# Patient Record
Sex: FEMALE | Race: WHITE | Employment: PART TIME | URBAN - NONMETROPOLITAN AREA
[De-identification: names, ages, dates, MRNs, and addresses within clinical notes are randomized per-mention and may not be internally consistent; named-entity substitution may affect disease eponyms.]

---

## 2018-07-10 ENCOUNTER — OFFICE VISIT (OUTPATIENT)
Dept: FAMILY MEDICINE CLINIC | Age: 43
End: 2018-07-10
Payer: COMMERCIAL

## 2018-07-10 VITALS
WEIGHT: 279 LBS | DIASTOLIC BLOOD PRESSURE: 62 MMHG | HEART RATE: 64 BPM | HEIGHT: 65 IN | BODY MASS INDEX: 46.48 KG/M2 | SYSTOLIC BLOOD PRESSURE: 118 MMHG

## 2018-07-10 DIAGNOSIS — Z13.220 SCREENING, LIPID: ICD-10-CM

## 2018-07-10 DIAGNOSIS — Z23 NEED FOR PNEUMOCOCCAL VACCINATION: ICD-10-CM

## 2018-07-10 DIAGNOSIS — Z13.1 SCREENING FOR DIABETES MELLITUS: ICD-10-CM

## 2018-07-10 DIAGNOSIS — Z72.0 TOBACCO USE: ICD-10-CM

## 2018-07-10 DIAGNOSIS — N93.8 DUB (DYSFUNCTIONAL UTERINE BLEEDING): Primary | ICD-10-CM

## 2018-07-10 DIAGNOSIS — Z23 NEED FOR TDAP VACCINATION: ICD-10-CM

## 2018-07-10 PROCEDURE — 99204 OFFICE O/P NEW MOD 45 MIN: CPT | Performed by: FAMILY MEDICINE

## 2018-07-10 RX ORDER — BUPROPION HYDROCHLORIDE 150 MG/1
150 TABLET, EXTENDED RELEASE ORAL 2 TIMES DAILY
Qty: 60 TABLET | Refills: 3 | Status: SHIPPED | OUTPATIENT
Start: 2018-07-10 | End: 2018-09-06 | Stop reason: SDUPTHER

## 2018-07-10 ASSESSMENT — PATIENT HEALTH QUESTIONNAIRE - PHQ9
SUM OF ALL RESPONSES TO PHQ9 QUESTIONS 1 & 2: 0
2. FEELING DOWN, DEPRESSED OR HOPELESS: 0
1. LITTLE INTEREST OR PLEASURE IN DOING THINGS: 0
SUM OF ALL RESPONSES TO PHQ QUESTIONS 1-9: 0

## 2018-07-10 NOTE — PROGRESS NOTES
Standing Expiration Date:   7/10/2019    Glucose, Fasting     Standing Status:   Future     Standing Expiration Date:   7/10/2019    External Referral To Ob-Gyn     Referral Priority:   Routine     Referral Type:   Eval and Treat     Referral Reason:   Specialty Services Required     Referred to Provider:   Ernesto Henderson MD     Requested Specialty:   Obstetrics & Gynecology     Number of Visits Requested:   1       Orders Placed This Encounter   Medications    buPROPion (WELLBUTRIN SR) 150 MG extended release tablet     Sig: Take 1 tablet by mouth 2 times daily     Dispense:  60 tablet     Refill:  3        Return in about 2 months (around 9/10/2018) for tob use. Discussed use, benefit, and side effects of prescribed medications. All patient questions answered. Pt voiced understanding. Reviewed health maintenance, reviewed Tdap and pneumovax. Instructed to continue current medications, diet and exercise. Patient agreed with treatment plan. Follow up as directed.      Electronically signed by Nacho Villa MD on 7/10/2018

## 2018-08-15 ENCOUNTER — OFFICE VISIT (OUTPATIENT)
Dept: FAMILY MEDICINE CLINIC | Age: 43
End: 2018-08-15
Payer: COMMERCIAL

## 2018-08-15 VITALS
HEART RATE: 89 BPM | DIASTOLIC BLOOD PRESSURE: 80 MMHG | BODY MASS INDEX: 45.1 KG/M2 | SYSTOLIC BLOOD PRESSURE: 120 MMHG | WEIGHT: 271 LBS

## 2018-08-15 DIAGNOSIS — R29.898 WEAKNESS OF HAND: Primary | ICD-10-CM

## 2018-08-15 DIAGNOSIS — F41.9 ANXIETY: ICD-10-CM

## 2018-08-15 PROCEDURE — 99214 OFFICE O/P EST MOD 30 MIN: CPT | Performed by: FAMILY MEDICINE

## 2018-08-15 RX ORDER — BUSPIRONE HYDROCHLORIDE 7.5 MG/1
7.5 TABLET ORAL 2 TIMES DAILY
Qty: 60 TABLET | Refills: 1 | Status: SHIPPED | OUTPATIENT
Start: 2018-08-15 | End: 2018-09-06 | Stop reason: SINTOL

## 2018-08-15 NOTE — PROGRESS NOTES
pressumed anxiety. Discussed use, benefit, and side effects of prescribed medications. All patient questions answered. Pt voiced understanding. Instructed to continue current medications, diet and exercise. Patient agreed with treatment plan. Follow up as directed.      Electronically signed by Darrin Aldridge MD on 8/15/2018

## 2018-08-23 LAB
BASOPHILS # BLD: 0.06 THOU/MM3
CHOLESTEROL/HDL RATIO: 5.2 RATIO
CHOLESTEROL: 157 MG/DL
DIFFERENTIAL: AUTOMATED DIFF
EOSINOPHIL # BLD: 0.06 THOU/MM3
GLUCOSE: 89 MG/DL
HCT VFR BLD CALC: 43 %
HDL, DIRECT: 30 MG/DL
HEMOGLOBIN: 13.9 G/DL
LDL CHOLESTEROL CALCULATED: 111.8 MG/DL
LYMPHOCYTES # BLD: 2.18 THOU/MM3
MCH RBC QN AUTO: 27.7 PG
MCHC RBC AUTO-ENTMCNC: 32.2 G/DL
MCV RBC AUTO: 86 FL
MONOCYTES # BLD: 0.38 THOU/MM3
NEUTROPHILS: 3.74 THOU/MM3
PDW BLD-RTO: 12.3 %
PLATELET # BLD: 232 THOU/MM3
PMV BLD AUTO: 10.2 FL
RBC # BLD: 5.01 M/UL
TRIGL SERPL-MCNC: 76 MG/DL
TSH SERPL DL<=0.05 MIU/L-ACNC: 2.04 MIU/ML
VLDLC SERPL CALC-MCNC: 15 MG/DL
WBC # BLD: 6.42 THOU/ML3

## 2018-09-06 ENCOUNTER — OFFICE VISIT (OUTPATIENT)
Dept: FAMILY MEDICINE CLINIC | Age: 43
End: 2018-09-06
Payer: COMMERCIAL

## 2018-09-06 VITALS
BODY MASS INDEX: 43.82 KG/M2 | DIASTOLIC BLOOD PRESSURE: 62 MMHG | HEART RATE: 76 BPM | WEIGHT: 263 LBS | SYSTOLIC BLOOD PRESSURE: 118 MMHG | HEIGHT: 65 IN

## 2018-09-06 DIAGNOSIS — Z72.0 TOBACCO USE: Primary | ICD-10-CM

## 2018-09-06 DIAGNOSIS — F41.9 ANXIETY: ICD-10-CM

## 2018-09-06 PROCEDURE — 99213 OFFICE O/P EST LOW 20 MIN: CPT | Performed by: FAMILY MEDICINE

## 2018-09-06 RX ORDER — BUPROPION HYDROCHLORIDE 200 MG/1
200 TABLET, EXTENDED RELEASE ORAL 2 TIMES DAILY
Qty: 60 TABLET | Refills: 5 | Status: SHIPPED | OUTPATIENT
Start: 2018-09-06 | End: 2020-07-07 | Stop reason: SDUPTHER

## 2018-09-06 NOTE — PROGRESS NOTES
her feel funny, she does not remember driving home from work that day, felt very zoned out so she has not taken it since the first day       Objective:     /62   Pulse 76   Ht 5' 5\" (1.651 m)   Wt 263 lb (119.3 kg)   BMI 43.77 kg/m²     Physical Exam   Constitutional: She is oriented to person, place, and time. She appears well-developed and well-nourished. No distress. Obesity improving   HENT:   Head: Atraumatic. Neck: Neck supple. Carotid bruit is not present. No thyromegaly present. Cardiovascular: Normal rate and regular rhythm. No murmur heard. Pulmonary/Chest: Effort normal. She has wheezes (diffuse inspiratory mild). She has rhonchi (diffuse more upper inspiratory noted bilaterally). Abdominal: Soft. Bowel sounds are normal.   Neurological: She is alert and oriented to person, place, and time. Down 8 # since last evaluation. Assessment:     1. Tobacco use    2. Anxiety      No results found for this visit on 09/06/18. Plan:   No orders of the defined types were placed in this encounter. Orders Placed This Encounter   Medications    buPROPion (WELLBUTRIN SR) 200 MG extended release tablet     Sig: Take 1 tablet by mouth 2 times daily     Dispense:  60 tablet     Refill:  5     New dose to stop 150        Return in about 6 months (around 3/6/2019) for anxiety/tobacco.  Discussed use, benefit, and side effects of prescribed medications. All patient questions answered. Pt voiced understanding. Reviewed health maintenance. Instructed to continue current medications, diet and exercise. Patient agreed with treatment plan. Follow up as directed.      Electronically signed by Elida Adams MD on 9/6/2018

## 2019-04-18 ENCOUNTER — OFFICE VISIT (OUTPATIENT)
Dept: FAMILY MEDICINE CLINIC | Age: 44
End: 2019-04-18
Payer: COMMERCIAL

## 2019-04-18 VITALS
HEART RATE: 85 BPM | BODY MASS INDEX: 39.49 KG/M2 | SYSTOLIC BLOOD PRESSURE: 122 MMHG | WEIGHT: 237 LBS | HEIGHT: 65 IN | OXYGEN SATURATION: 96 % | DIASTOLIC BLOOD PRESSURE: 72 MMHG

## 2019-04-18 DIAGNOSIS — Z72.0 TOBACCO USE: ICD-10-CM

## 2019-04-18 DIAGNOSIS — E78.6 HDL DEFICIENCY: ICD-10-CM

## 2019-04-18 DIAGNOSIS — B35.1 ONYCHOMYCOSIS: ICD-10-CM

## 2019-04-18 DIAGNOSIS — F41.9 ANXIETY: Primary | ICD-10-CM

## 2019-04-18 PROCEDURE — 99214 OFFICE O/P EST MOD 30 MIN: CPT | Performed by: FAMILY MEDICINE

## 2019-04-18 ASSESSMENT — ENCOUNTER SYMPTOMS
ABDOMINAL PAIN: 0
SHORTNESS OF BREATH: 0
VOMITING: 0
DIARRHEA: 0
NAUSEA: 0

## 2019-04-18 ASSESSMENT — PATIENT HEALTH QUESTIONNAIRE - PHQ9
2. FEELING DOWN, DEPRESSED OR HOPELESS: 0
SUM OF ALL RESPONSES TO PHQ QUESTIONS 1-9: 0
SUM OF ALL RESPONSES TO PHQ9 QUESTIONS 1 & 2: 0
SUM OF ALL RESPONSES TO PHQ QUESTIONS 1-9: 0
1. LITTLE INTEREST OR PLEASURE IN DOING THINGS: 0

## 2019-04-18 NOTE — PATIENT INSTRUCTIONS
May add nicoderm or nicorrette gum to current medication  May consider nail removal if needed  Vicks vaporub daily to affected toe

## 2019-04-18 NOTE — PROGRESS NOTES
[address] [facility] [place]  Dept: [phone]  Dept Fax:[phone]      Asmita Headley is a 44 y.o. female who presents today for her medical conditions/complaints as noted below. Asmita Headley is c/o of Anxiety      HPI:      HPI  Pt here to follow up on anxiety  Taking medications regularly, still feeling not controlled at this time. Reviewed sleep issues with pt  Increased stresses at worked    When had added buspar to help with anxiety had issues with memory.     Smoking 4 cigs per pt     No further arm weakness episodes as prior  Left 2nd toenail thick      BP Readings from Last 3 Encounters:   04/18/19 122/72   09/06/18 118/62   08/15/18 120/80          (goal 120/80)    Past Medical History:   Diagnosis Date    Asthma       Past Surgical History:   Procedure Laterality Date    HYSTEROSCOPY  10/23/2018    with ablation    LEEP  12/2/15    HGSIL    TONSILLECTOMY      TUBAL LIGATION Bilateral        Family History   Problem Relation Age of Onset    Other Mother         Bowel obstruction surgery, passed away after surgery    High Blood Pressure Father     Heart Attack Father     Other Father         Gout, Hepatitis          Social History     Tobacco Use    Smoking status: Current Every Day Smoker     Packs/day: 0.25     Years: 22.00     Pack years: 5.50     Types: Cigarettes    Smokeless tobacco: Never Used    Tobacco comment: 4 cigs daily   Substance Use Topics    Alcohol use: No     Alcohol/week: 0.0 oz         Current Outpatient Medications   Medication Sig Dispense Refill    buPROPion (WELLBUTRIN SR) 200 MG extended release tablet Take 1 tablet by mouth 2 times daily 60 tablet 5     No current facility-administered medications for this visit.       Allergies   Allergen Reactions    Codeine Rash       Health Maintenance   Topic Date Due    HIV screen  07/26/1991    Cervical cancer screen  04/20/2019    Lipid screen 08/23/2023    DTaP/Tdap/Td vaccine (2 - Td) 08/23/2028    Flu vaccine  Completed    Pneumococcal 0-64 years Vaccine  Completed       Subjective:      Review of Systems   Constitutional: Negative for fatigue and fever. Respiratory: Negative for shortness of breath. Cardiovascular: Negative for chest pain and leg swelling. Gastrointestinal: Negative for abdominal pain, diarrhea, nausea and vomiting. Neurological: Negative for dizziness. Psychiatric/Behavioral: Positive for agitation (work). Negative for self-injury, sleep disturbance and suicidal ideas. The patient is nervous/anxious (little bit of anxiousness, doesn't think the wellbutrin is working). Mood: sad, angry  Suicidal thoughts? no  PreviousPsychiatrist/Counseling? no    Objective:     /72 (Site: Left Upper Arm, Position: Sitting, Cuff Size: Large Adult)   Pulse 85   Ht 5' 5\" (1.651 m)   Wt 237 lb (107.5 kg)   SpO2 96%   BMI 39.44 kg/m²    Physical Exam   Constitutional: She is oriented to person, place, and time. She appears well-developed and well-nourished. No distress. HENT:   Head: Atraumatic. Neck: Neck supple. Carotid bruit is not present. No thyromegaly present. Cardiovascular: Normal rate and regular rhythm. No murmur heard. Pulmonary/Chest: Effort normal and breath sounds normal.   Abdominal: Soft. Bowel sounds are normal.   Neurological: She is alert and oriented to person, place, and time. Skin:   Left 2nd nail extremely thick and white noted. Vitals reviewed. Down over 25 # and over 72 # total    Assessment:      1. Anxiety    2. Tobacco use    3. BMI 39.0-39.9,adult    4. HDL deficiency    5.  Onychomycosis                     Plan:     Patient Instructions   May add nicoderm or nicorrette gum to current medication  May consider nail removal if needed  Vicks vaporub daily to affected toe    Orders Placed This Encounter   Procedures    Lipid Panel     Standing Status:   Future     Standing Expiration Date:   4/17/2020     Order Specific Question:   Is Patient Fasting?/# of Hours     Answer:   10-12    TSH without Reflex     Standing Status:   Future     Standing Expiration Date:   4/17/2020    Electrolyte Panel     Standing Status:   Future     Standing Expiration Date:   4/17/2020    Glucose, Fasting     Standing Status:   Future     Standing Expiration Date:   4/17/2020     No orders of the defined types were placed in this encounter. Return in about 6 months (around 10/18/2019) for anxiety. Discussed use, benefit, and side effects of prescribed medications. All patient questions answered. Pt voiced understanding. Instructed to continue current medications, diet and exercise. Patient agreed with treatment plan. Follow up as directed.      Electronically signedby Justino Barnhart MD on 4/18/2019

## 2019-05-08 ENCOUNTER — OFFICE VISIT (OUTPATIENT)
Dept: FAMILY MEDICINE CLINIC | Age: 44
End: 2019-05-08
Payer: COMMERCIAL

## 2019-05-08 VITALS
TEMPERATURE: 98.6 F | OXYGEN SATURATION: 93 % | BODY MASS INDEX: 38.69 KG/M2 | SYSTOLIC BLOOD PRESSURE: 122 MMHG | DIASTOLIC BLOOD PRESSURE: 78 MMHG | HEART RATE: 85 BPM | HEIGHT: 65 IN | WEIGHT: 232.2 LBS

## 2019-05-08 DIAGNOSIS — J30.2 SEASONAL ALLERGIES: Primary | ICD-10-CM

## 2019-05-08 PROCEDURE — 99213 OFFICE O/P EST LOW 20 MIN: CPT | Performed by: FAMILY MEDICINE

## 2019-05-08 RX ORDER — LORATADINE 10 MG/1
10 TABLET ORAL DAILY
COMMUNITY

## 2019-05-08 RX ORDER — FLUTICASONE PROPIONATE 50 MCG
2 SPRAY, SUSPENSION (ML) NASAL DAILY
Qty: 1 BOTTLE | Refills: 5 | Status: SHIPPED | OUTPATIENT
Start: 2019-05-08 | End: 2020-07-28 | Stop reason: SDUPTHER

## 2019-05-08 RX ORDER — IPRATROPIUM BROMIDE 42 UG/1
2 SPRAY, METERED NASAL 4 TIMES DAILY PRN
Qty: 1 BOTTLE | Refills: 1 | Status: SHIPPED | OUTPATIENT
Start: 2019-05-08 | End: 2020-05-07

## 2019-05-08 ASSESSMENT — ENCOUNTER SYMPTOMS
SORE THROAT: 1
COUGH: 1
CHEST TIGHTNESS: 1
SINUS PAIN: 1
DIARRHEA: 0
NAUSEA: 0
VOMITING: 0
SINUS PRESSURE: 1
WHEEZING: 1
SHORTNESS OF BREATH: 1
ABDOMINAL PAIN: 0

## 2019-05-08 NOTE — PROGRESS NOTES
[address] [address]  Dept: [phone]  Dept Fax: [phone]    Jeevan Wilkes is a 44 y.o. female who presents today for her medical conditions/complaints as noted below. Jeevan Wilkes c/o of No chief complaint on file. HPI:     HPI   Patient reports 2 days of symptoms which began after being around her sick grandson ill past week no other ill contacts were noted she notes congestion in her pain and sinus pressure and pain as well as sore throat pain. Patient has also noticed some cough shortness of breath and chest tightness with wheezing. Fatigue is noted. Feels pain settling into upper chest bilaterally. Severe runny nose  No fevers have been appreciated. BP Readings from Last 3 Encounters:   05/08/19 122/78   04/18/19 122/72   09/06/18 118/62          (goal 120/80)    Past Medical History:   Diagnosis Date    Asthma       Past Surgical History:   Procedure Laterality Date    HYSTEROSCOPY  10/23/2018    with ablation    LEEP  12/2/15    HGSIL    TONSILLECTOMY      TUBAL LIGATION Bilateral        Family History   Problem Relation Age of Onset    Other Mother         Bowel obstruction surgery, passed away after surgery    High Blood Pressure Father     Heart Attack Father     Other Father         Gout, Hepatitis       Social History     Tobacco Use    Smoking status: Current Every Day Smoker     Packs/day: 0.25     Years: 22.00     Pack years: 5.50     Types: Cigarettes    Smokeless tobacco: Never Used    Tobacco comment: 2 cigs daily   Substance Use Topics    Alcohol use: No     Alcohol/week: 0.0 oz      Prior to Admission medications    Medication Sig Start Date End Date Taking?  Authorizing Provider   loratadine (CLARITIN) 10 MG tablet Take 10 mg by mouth daily Indications: in season   Yes Historical Provider, MD   fluticasone (FLONASE) 50 MCG/ACT nasal spray 2 sprays by Each Nare route daily 2 Sprays in each nostril 5/8/19  Yes Sandra Lopez MD   ipratropium (ATROVENT) 0.06 % nasal spray 2 sprays by Nasal route 4 times daily as needed for Rhinitis 5/8/19 5/7/20 Yes Sandra Lopez MD   buPROPion (WELLBUTRIN SR) 200 MG extended release tablet Take 1 tablet by mouth 2 times daily 9/6/18  Yes Sandra Lopez MD     Allergies   Allergen Reactions    Codeine Rash       Health Maintenance   Topic Date Due    HIV screen  07/26/1991    Cervical cancer screen  04/20/2019    Lipid screen  08/23/2023    DTaP/Tdap/Td vaccine (2 - Td) 08/23/2028    Flu vaccine  Completed    Pneumococcal 0-64 years Vaccine  Completed       Subjective:      Review of Systems   Constitutional: Positive for fatigue. Negative for fever. All symptoms started two days ago, her grandson has been sick but hasn't been around anyone else who is sick. HENT: Positive for congestion, ear pain, sinus pressure, sinus pain and sore throat. Respiratory: Positive for cough, chest tightness, shortness of breath and wheezing. Cardiovascular: Negative for chest pain. Gastrointestinal: Negative for abdominal pain, diarrhea, nausea and vomiting. Neurological: Negative for dizziness and headaches. Objective:     /78 (Site: Left Upper Arm, Position: Sitting, Cuff Size: Large Adult)   Pulse 85   Temp 98.6 °F (37 °C) (Tympanic)   Ht 5' 5\" (1.651 m)   Wt 232 lb 3.2 oz (105.3 kg)   SpO2 93%   BMI 38.64 kg/m²     Physical Exam   Constitutional: She is oriented to person, place, and time. She appears well-developed and well-nourished. No distress. HENT:   Head: Atraumatic. Neck: Neck supple. Carotid bruit is not present. No thyromegaly present. Cardiovascular: Normal rate and regular rhythm. No murmur heard. Pulmonary/Chest: Effort normal. She has decreased breath sounds (throughout). She has no wheezes. She has no rhonchi. Abdominal: Soft. Bowel sounds are normal.   Musculoskeletal: She exhibits no edema.    Neurological: She is alert and oriented to person, place, and time. Psychiatric: She has a normal mood and affect. Vitals reviewed. Assessment:     1. Seasonal allergies      No results found for this visit on 19. Plan:   No orders of the defined types were placed in this encounter. Orders Placed This Encounter   Medications    fluticasone (FLONASE) 50 MCG/ACT nasal spray     Si sprays by Each Nare route daily 2 Sprays in each nostril     Dispense:  1 Bottle     Refill:  5    ipratropium (ATROVENT) 0.06 % nasal spray     Si sprays by Nasal route 4 times daily as needed for Rhinitis     Dispense:  1 Bottle     Refill:  1        Return in about 6 weeks (around 2019) for allergies if not better. Discussed use, benefit, and side effects of prescribed medications. All patient questions answered. Pt voiced understanding. Instructed to continue current medications, diet and exercise. Patient agreed with treatment plan. Follow up as directed.      Electronically signed by Christo Vega MD on 2019

## 2020-07-07 RX ORDER — BUPROPION HYDROCHLORIDE 200 MG/1
200 TABLET, EXTENDED RELEASE ORAL 2 TIMES DAILY
Qty: 60 TABLET | Refills: 0 | Status: SHIPPED | OUTPATIENT
Start: 2020-07-07 | End: 2020-07-28 | Stop reason: SDUPTHER

## 2020-07-07 NOTE — TELEPHONE ENCOUNTER
Galina Tomlinson is calling to request a refill on the following medication(s):  Requested Prescriptions     Pending Prescriptions Disp Refills    buPROPion (WELLBUTRIN SR) 200 MG extended release tablet 60 tablet 5     Sig: Take 1 tablet by mouth 2 times daily       Last Visit Date (If Applicable):  1/3/4419    Next Visit Date:    7/14/20

## 2020-07-28 ENCOUNTER — OFFICE VISIT (OUTPATIENT)
Dept: FAMILY MEDICINE CLINIC | Age: 45
End: 2020-07-28
Payer: COMMERCIAL

## 2020-07-28 VITALS
BODY MASS INDEX: 39.79 KG/M2 | SYSTOLIC BLOOD PRESSURE: 118 MMHG | HEART RATE: 69 BPM | DIASTOLIC BLOOD PRESSURE: 74 MMHG | HEIGHT: 65 IN | WEIGHT: 238.8 LBS | OXYGEN SATURATION: 97 %

## 2020-07-28 PROCEDURE — 99214 OFFICE O/P EST MOD 30 MIN: CPT | Performed by: FAMILY MEDICINE

## 2020-07-28 RX ORDER — BUPROPION HYDROCHLORIDE 200 MG/1
200 TABLET, EXTENDED RELEASE ORAL 2 TIMES DAILY
Qty: 60 TABLET | Refills: 5 | Status: SHIPPED | OUTPATIENT
Start: 2020-07-28

## 2020-07-28 RX ORDER — FLUTICASONE PROPIONATE 50 MCG
2 SPRAY, SUSPENSION (ML) NASAL DAILY
Qty: 1 BOTTLE | Refills: 11 | Status: SHIPPED | OUTPATIENT
Start: 2020-07-28

## 2020-07-28 ASSESSMENT — PATIENT HEALTH QUESTIONNAIRE - PHQ9
SUM OF ALL RESPONSES TO PHQ QUESTIONS 1-9: 0
SUM OF ALL RESPONSES TO PHQ9 QUESTIONS 1 & 2: 0
SUM OF ALL RESPONSES TO PHQ QUESTIONS 1-9: 0
2. FEELING DOWN, DEPRESSED OR HOPELESS: 0
1. LITTLE INTEREST OR PLEASURE IN DOING THINGS: 0

## 2020-07-28 ASSESSMENT — ENCOUNTER SYMPTOMS
COUGH: 1
ABDOMINAL PAIN: 0
SHORTNESS OF BREATH: 0
WHEEZING: 0

## 2020-09-30 ENCOUNTER — OFFICE VISIT (OUTPATIENT)
Dept: FAMILY MEDICINE CLINIC | Age: 45
End: 2020-09-30
Payer: COMMERCIAL

## 2020-09-30 VITALS
WEIGHT: 242.2 LBS | HEART RATE: 84 BPM | OXYGEN SATURATION: 96 % | DIASTOLIC BLOOD PRESSURE: 78 MMHG | HEIGHT: 65 IN | TEMPERATURE: 97.9 F | BODY MASS INDEX: 40.35 KG/M2 | SYSTOLIC BLOOD PRESSURE: 122 MMHG

## 2020-09-30 PROBLEM — R10.13 EPIGASTRIC PAIN: Status: ACTIVE | Noted: 2020-09-30

## 2020-09-30 PROCEDURE — 99213 OFFICE O/P EST LOW 20 MIN: CPT | Performed by: FAMILY MEDICINE

## 2020-09-30 PROCEDURE — 90471 IMMUNIZATION ADMIN: CPT | Performed by: FAMILY MEDICINE

## 2020-09-30 PROCEDURE — 90686 IIV4 VACC NO PRSV 0.5 ML IM: CPT | Performed by: FAMILY MEDICINE

## 2020-09-30 RX ORDER — OMEPRAZOLE 20 MG/1
20 CAPSULE, DELAYED RELEASE ORAL
Qty: 30 CAPSULE | Refills: 5 | Status: SHIPPED | OUTPATIENT
Start: 2020-09-30

## 2020-09-30 ASSESSMENT — ENCOUNTER SYMPTOMS
DIARRHEA: 1
ABDOMINAL PAIN: 1
COUGH: 0
SHORTNESS OF BREATH: 0
WHEEZING: 0
CONSTIPATION: 0
VOMITING: 0
NAUSEA: 1

## 2020-09-30 NOTE — PROGRESS NOTES
[address] [address]  Dept: [phone]  Dept Fax: [phone]    Timmy Umana is a 41 y.o. female who presents today for her medical conditions/complaints as noted below. Timmy Umana c/o of Abdominal Pain      HPI:     HPI   Pt here for evaluation of abdominal pain- epigastric  Concerns with several stomach related issues noted in updated family Hx. No fevers  Patient reports initial abdominal pains approximately 2 weeks ago in the mid abdomen goes down to her left lower side around her back. Patient reports Sunday the pain was constant prior to that would come and go. No similar issues prior. Hx of ulcer at 26 yo  More stress and fatty food over weekend      No pain so far this week. Family history significant for mother passed from diverticulitis maternal grandmother with have bladder cancer and maternal aunt with colon cancer. Diarrhea had been noted Monday afternoon no significant vomiting slight nausea was noted.     Decreasing tob use with wellbutrin    PAP last done 2016    BP Readings from Last 3 Encounters:   09/30/20 122/78   07/28/20 118/74   05/08/19 122/78          (goal 120/80)    Past Medical History:   Diagnosis Date    Asthma       Past Surgical History:   Procedure Laterality Date    HYSTEROSCOPY  10/23/2018    with ablation    LEEP  12/2/15    HGSIL    TONSILLECTOMY  1992    TUBAL LIGATION Bilateral     2001       Family History   Problem Relation Age of Onset    Other Mother         Bowel obstruction surgery, passed away after surgery    High Blood Pressure Father     Heart Attack Father     Other Father         Gout, Hepatitis    Cancer Maternal Grandmother         bladder cancer    Cancer Sister 35        post pregnancy    Cancer Maternal Aunt         colon cancer       Social History     Tobacco Use    Smoking status: Current Every Day Smoker     Packs/day: 0.25     Years: 22.00     Pack years: 5.50     Types: Cigarettes    Smokeless tobacco: Never Used    Tobacco comment: 1/4 ppd   Substance Use Topics    Alcohol use: No     Alcohol/week: 0.0 standard drinks      Prior to Visit Medications    Medication Sig Taking? Authorizing Provider   fluticasone (FLONASE) 50 MCG/ACT nasal spray 2 sprays by Each Nostril route daily 2 Sprays in each nostril Yes Aiden Novoa MD   buPROPion (WELLBUTRIN SR) 200 MG extended release tablet Take 1 tablet by mouth 2 times daily Yes Aiden Novoa MD   loratadine (CLARITIN) 10 MG tablet Take 10 mg by mouth daily Indications: in season Yes Historical Provider, MD   ipratropium (ATROVENT) 0.06 % nasal spray 2 sprays by Nasal route 4 times daily as needed for Rhinitis  Aiden Novoa MD     Allergies   Allergen Reactions    Codeine Rash       Health Maintenance   Topic Date Due    HIV screen  07/26/1991    Cervical cancer screen  04/20/2019    Lipid screen  08/23/2023    DTaP/Tdap/Td vaccine (2 - Td) 08/23/2028    Flu vaccine  Completed    Pneumococcal 0-64 years Vaccine  Completed    Hepatitis A vaccine  Aged Out    Hepatitis B vaccine  Aged Out    Hib vaccine  Aged Out    Meningococcal (ACWY) vaccine  Aged Out       Subjective:      Review of Systems   Constitutional: Negative for fatigue and fever. Mom passed away when she was 7 of difficulty with diverticulitis. Grandma had bladder cancer. Moms sister passed of colon cancer. Respiratory: Negative for cough, shortness of breath and wheezing. Cardiovascular: Negative for chest pain, palpitations and leg swelling. Gastrointestinal: Positive for abdominal pain, diarrhea ( monday afternoon.) and nausea. Negative for constipation and vomiting. The first time it happened was probably 2 weeks ago. Starts in the mid abdomen and goes down to the lower left side and around to her back. Sunday pain was constant. Prior to Sunday pain came and went. Denies any pain so far this week. Genitourinary: Negative for difficulty urinating, frequency, hematuria and urgency. Neurological: Negative for dizziness and headaches. Objective:     /78   Pulse 84   Temp 97.9 °F (36.6 °C)   Ht 5' 5\" (1.651 m)   Wt 242 lb 3.2 oz (109.9 kg)   SpO2 96%   BMI 40.30 kg/m²     Physical Exam  Vitals signs reviewed. Constitutional:       General: She is not in acute distress. Appearance: She is well-developed. HENT:      Head: Atraumatic. Neck:      Musculoskeletal: Neck supple. Thyroid: No thyromegaly. Vascular: No carotid bruit. Cardiovascular:      Rate and Rhythm: Normal rate and regular rhythm. Heart sounds: No murmur. Pulmonary:      Effort: Pulmonary effort is normal.      Breath sounds: Normal breath sounds. Abdominal:      General: Bowel sounds are normal. There is no distension. Palpations: Abdomen is soft. Tenderness: There is abdominal tenderness (upper abdomen from right to left with most midline). Musculoskeletal:         General: No swelling (BLE). Neurological:      Mental Status: She is alert and oriented to person, place, and time. Psychiatric:         Thought Content: Thought content normal.         Judgment: Judgment normal.       CT abdomen done Feb 2020 unremarkable    Assessment:     1. Epigastric pain    2. Need for influenza vaccination    3. BMI 40.0-44.9, adult ([facility] [address]      No results found for this visit on 09/30/20. Plan:     Orders Placed This Encounter   Procedures    INFLUENZA, QUADV, 3 YRS AND OLDER, IM PF, PREFILL SYR OR SDV, 0.5ML (AFLURIA QUADV, PF)           No follow-ups on file. Patient Instructions   May have HIDA scan if symptoms not improving in 1 week  Complete 2 months of omeprazole if helpful       Discussed use, benefit, and side effects of prescribed medications. All patient questions answered. Pt voiced understanding. Reviewed health maintenance flu vaccine given.   Instructed to continue
